# Patient Record
Sex: MALE | Race: WHITE | NOT HISPANIC OR LATINO
[De-identification: names, ages, dates, MRNs, and addresses within clinical notes are randomized per-mention and may not be internally consistent; named-entity substitution may affect disease eponyms.]

---

## 2017-06-09 ENCOUNTER — LABORATORY RESULT (OUTPATIENT)
Age: 62
End: 2017-06-09

## 2017-06-09 ENCOUNTER — APPOINTMENT (OUTPATIENT)
Dept: INTERNAL MEDICINE | Facility: CLINIC | Age: 62
End: 2017-06-09

## 2017-06-12 LAB
ALBUMIN SERPL ELPH-MCNC: 4.5 G/DL
ALP BLD-CCNC: 50 U/L
ALT SERPL-CCNC: 12 U/L
ANION GAP SERPL CALC-SCNC: 20 MMOL/L
AST SERPL-CCNC: 17 U/L
BASOPHILS # BLD AUTO: 0.06 K/UL
BASOPHILS NFR BLD AUTO: 0.8 %
BILIRUB SERPL-MCNC: 0.7 MG/DL
BUN SERPL-MCNC: 15 MG/DL
CALCIUM SERPL-MCNC: 9.6 MG/DL
CHLORIDE SERPL-SCNC: 103 MMOL/L
CHOLEST SERPL-MCNC: 168 MG/DL
CHOLEST/HDLC SERPL: 2.8 RATIO
CO2 SERPL-SCNC: 21 MMOL/L
CREAT SERPL-MCNC: 1.1 MG/DL
EOSINOPHIL # BLD AUTO: 0.55 K/UL
EOSINOPHIL NFR BLD AUTO: 6.9 %
GLUCOSE SERPL-MCNC: 102 MG/DL
HBA1C MFR BLD HPLC: 5 %
HCT VFR BLD CALC: 45.4 %
HDLC SERPL-MCNC: 59 MG/DL
HGB BLD-MCNC: 15.7 G/DL
IMM GRANULOCYTES NFR BLD AUTO: 0.1 %
LDLC SERPL CALC-MCNC: 101 MG/DL
LDLC SERPL DIRECT ASSAY-MCNC: 109 MG/DL
LYMPHOCYTES # BLD AUTO: 2.95 K/UL
LYMPHOCYTES NFR BLD AUTO: 37.2 %
MAN DIFF?: NORMAL
MCHC RBC-ENTMCNC: 29.3 PG
MCHC RBC-ENTMCNC: 34.6 GM/DL
MCV RBC AUTO: 84.7 FL
MONOCYTES # BLD AUTO: 0.73 K/UL
MONOCYTES NFR BLD AUTO: 9.2 %
NEUTROPHILS # BLD AUTO: 3.63 K/UL
NEUTROPHILS NFR BLD AUTO: 45.8 %
PLATELET # BLD AUTO: 257 K/UL
POTASSIUM SERPL-SCNC: 4.7 MMOL/L
PROT SERPL-MCNC: 7 G/DL
PSA SERPL-MCNC: 1.17 NG/ML
RBC # BLD: 5.36 M/UL
RBC # FLD: 13.9 %
SAVE SPECIMEN: NORMAL
SODIUM SERPL-SCNC: 144 MMOL/L
T3 SERPL-MCNC: 112 NG/DL
T3RU NFR SERPL: 0.88 INDEX
T4 FREE SERPL-MCNC: 1.7 NG/DL
TRIGL SERPL-MCNC: 38 MG/DL
TSH SERPL-ACNC: 1.74 UIU/ML
WBC # FLD AUTO: 7.93 K/UL

## 2017-06-24 ENCOUNTER — APPOINTMENT (OUTPATIENT)
Dept: INTERNAL MEDICINE | Facility: CLINIC | Age: 62
End: 2017-06-24

## 2017-06-24 VITALS
HEIGHT: 71 IN | SYSTOLIC BLOOD PRESSURE: 108 MMHG | BODY MASS INDEX: 24.22 KG/M2 | WEIGHT: 173 LBS | DIASTOLIC BLOOD PRESSURE: 74 MMHG

## 2017-09-19 ENCOUNTER — APPOINTMENT (OUTPATIENT)
Dept: OPHTHALMOLOGY | Facility: CLINIC | Age: 62
End: 2017-09-19
Payer: COMMERCIAL

## 2017-09-19 DIAGNOSIS — H43.811 VITREOUS DEGENERATION, RIGHT EYE: ICD-10-CM

## 2017-09-19 PROCEDURE — 92134 CPTRZ OPH DX IMG PST SGM RTA: CPT

## 2017-09-19 PROCEDURE — 92004 COMPRE OPH EXAM NEW PT 1/>: CPT

## 2018-01-28 ENCOUNTER — TRANSCRIPTION ENCOUNTER (OUTPATIENT)
Age: 63
End: 2018-01-28

## 2018-02-19 ENCOUNTER — APPOINTMENT (OUTPATIENT)
Dept: INTERNAL MEDICINE | Facility: CLINIC | Age: 63
End: 2018-02-19
Payer: COMMERCIAL

## 2018-02-19 VITALS
BODY MASS INDEX: 23.66 KG/M2 | SYSTOLIC BLOOD PRESSURE: 120 MMHG | DIASTOLIC BLOOD PRESSURE: 70 MMHG | HEIGHT: 71 IN | WEIGHT: 169 LBS

## 2018-02-19 PROCEDURE — 99213 OFFICE O/P EST LOW 20 MIN: CPT

## 2018-02-20 ENCOUNTER — APPOINTMENT (OUTPATIENT)
Dept: INTERNAL MEDICINE | Facility: CLINIC | Age: 63
End: 2018-02-20

## 2018-09-07 ENCOUNTER — LABORATORY RESULT (OUTPATIENT)
Age: 63
End: 2018-09-07

## 2018-09-07 ENCOUNTER — APPOINTMENT (OUTPATIENT)
Dept: INTERNAL MEDICINE | Facility: CLINIC | Age: 63
End: 2018-09-07
Payer: COMMERCIAL

## 2018-09-07 PROCEDURE — 36415 COLL VENOUS BLD VENIPUNCTURE: CPT

## 2018-09-10 LAB
ALBUMIN SERPL ELPH-MCNC: 4.2 G/DL
ALP BLD-CCNC: 53 U/L
ALT SERPL-CCNC: 10 U/L
ANION GAP SERPL CALC-SCNC: 13 MMOL/L
AST SERPL-CCNC: 21 U/L
BASOPHILS # BLD AUTO: 0.08 K/UL
BASOPHILS NFR BLD AUTO: 1.1 %
BILIRUB SERPL-MCNC: 0.6 MG/DL
BUN SERPL-MCNC: 13 MG/DL
CALCIUM SERPL-MCNC: 9.1 MG/DL
CHLORIDE SERPL-SCNC: 101 MMOL/L
CHOLEST SERPL-MCNC: 164 MG/DL
CHOLEST/HDLC SERPL: 3.3 RATIO
CO2 SERPL-SCNC: 25 MMOL/L
CREAT SERPL-MCNC: 0.94 MG/DL
EOSINOPHIL # BLD AUTO: 0.52 K/UL
EOSINOPHIL NFR BLD AUTO: 7.5 %
GLUCOSE SERPL-MCNC: 97 MG/DL
HBA1C MFR BLD HPLC: 5.1 %
HCT VFR BLD CALC: 45.7 %
HDLC SERPL-MCNC: 49 MG/DL
HGB BLD-MCNC: 15.2 G/DL
IMM GRANULOCYTES NFR BLD AUTO: 0.1 %
LDLC SERPL CALC-MCNC: 108 MG/DL
LDLC SERPL DIRECT ASSAY-MCNC: 110 MG/DL
LYMPHOCYTES # BLD AUTO: 2.33 K/UL
LYMPHOCYTES NFR BLD AUTO: 33.5 %
MAN DIFF?: NORMAL
MCHC RBC-ENTMCNC: 28.7 PG
MCHC RBC-ENTMCNC: 33.3 GM/DL
MCV RBC AUTO: 86.4 FL
MONOCYTES # BLD AUTO: 0.53 K/UL
MONOCYTES NFR BLD AUTO: 7.6 %
NEUTROPHILS # BLD AUTO: 3.49 K/UL
NEUTROPHILS NFR BLD AUTO: 50.2 %
PLATELET # BLD AUTO: 254 K/UL
POTASSIUM SERPL-SCNC: 4.7 MMOL/L
PROT SERPL-MCNC: 6.9 G/DL
PSA SERPL-MCNC: 1.13 NG/ML
RBC # BLD: 5.29 M/UL
RBC # FLD: 14.3 %
SAVE SPECIMEN: NORMAL
SODIUM SERPL-SCNC: 139 MMOL/L
T3 SERPL-MCNC: 120 NG/DL
T3RU NFR SERPL: 0.92 INDEX
T4 FREE SERPL-MCNC: 1.8 NG/DL
TRIGL SERPL-MCNC: 33 MG/DL
TSH SERPL-ACNC: 1.83 UIU/ML
WBC # FLD AUTO: 6.96 K/UL

## 2018-10-18 ENCOUNTER — EMERGENCY (EMERGENCY)
Facility: HOSPITAL | Age: 63
LOS: 1 days | Discharge: HOME | End: 2018-10-18
Attending: EMERGENCY MEDICINE
Payer: COMMERCIAL

## 2018-10-18 VITALS
TEMPERATURE: 98 F | HEIGHT: 71 IN | WEIGHT: 173.06 LBS | DIASTOLIC BLOOD PRESSURE: 84 MMHG | SYSTOLIC BLOOD PRESSURE: 156 MMHG | OXYGEN SATURATION: 97 % | HEART RATE: 67 BPM | RESPIRATION RATE: 18 BRPM

## 2018-10-18 VITALS
TEMPERATURE: 98 F | DIASTOLIC BLOOD PRESSURE: 95 MMHG | RESPIRATION RATE: 18 BRPM | OXYGEN SATURATION: 97 % | HEART RATE: 58 BPM | SYSTOLIC BLOOD PRESSURE: 137 MMHG

## 2018-10-18 LAB
ALBUMIN SERPL ELPH-MCNC: 4.4 G/DL — SIGNIFICANT CHANGE UP (ref 3.3–5)
ALP SERPL-CCNC: 55 U/L — SIGNIFICANT CHANGE UP (ref 40–120)
ALT FLD-CCNC: 13 U/L — SIGNIFICANT CHANGE UP (ref 10–45)
ANION GAP SERPL CALC-SCNC: 12 MMOL/L — SIGNIFICANT CHANGE UP (ref 5–17)
AST SERPL-CCNC: 12 U/L — SIGNIFICANT CHANGE UP (ref 10–40)
BASOPHILS # BLD AUTO: 0 K/UL — SIGNIFICANT CHANGE UP (ref 0–0.2)
BASOPHILS NFR BLD AUTO: 0.4 % — SIGNIFICANT CHANGE UP (ref 0–2)
BILIRUB SERPL-MCNC: 0.6 MG/DL — SIGNIFICANT CHANGE UP (ref 0.2–1.2)
BUN SERPL-MCNC: 10 MG/DL — SIGNIFICANT CHANGE UP (ref 7–23)
CALCIUM SERPL-MCNC: 8.7 MG/DL — SIGNIFICANT CHANGE UP (ref 8.4–10.5)
CHLORIDE SERPL-SCNC: 102 MMOL/L — SIGNIFICANT CHANGE UP (ref 96–108)
CO2 SERPL-SCNC: 26 MMOL/L — SIGNIFICANT CHANGE UP (ref 22–31)
CREAT SERPL-MCNC: 0.95 MG/DL — SIGNIFICANT CHANGE UP (ref 0.5–1.3)
EOSINOPHIL # BLD AUTO: 0.1 K/UL — SIGNIFICANT CHANGE UP (ref 0–0.5)
EOSINOPHIL NFR BLD AUTO: 0.9 % — SIGNIFICANT CHANGE UP (ref 0–6)
GLUCOSE SERPL-MCNC: 117 MG/DL — HIGH (ref 70–99)
HCT VFR BLD CALC: 49.2 % — SIGNIFICANT CHANGE UP (ref 39–50)
HGB BLD-MCNC: 17 G/DL — SIGNIFICANT CHANGE UP (ref 13–17)
LIDOCAIN IGE QN: 21 U/L — SIGNIFICANT CHANGE UP (ref 7–60)
LYMPHOCYTES # BLD AUTO: 1.8 K/UL — SIGNIFICANT CHANGE UP (ref 1–3.3)
LYMPHOCYTES # BLD AUTO: 22.9 % — SIGNIFICANT CHANGE UP (ref 13–44)
MCHC RBC-ENTMCNC: 29.5 PG — SIGNIFICANT CHANGE UP (ref 27–34)
MCHC RBC-ENTMCNC: 34.5 GM/DL — SIGNIFICANT CHANGE UP (ref 32–36)
MCV RBC AUTO: 85.3 FL — SIGNIFICANT CHANGE UP (ref 80–100)
MONOCYTES # BLD AUTO: 0.4 K/UL — SIGNIFICANT CHANGE UP (ref 0–0.9)
MONOCYTES NFR BLD AUTO: 5.6 % — SIGNIFICANT CHANGE UP (ref 2–14)
NEUTROPHILS # BLD AUTO: 5.6 K/UL — SIGNIFICANT CHANGE UP (ref 1.8–7.4)
NEUTROPHILS NFR BLD AUTO: 70.3 % — SIGNIFICANT CHANGE UP (ref 43–77)
PLATELET # BLD AUTO: 259 K/UL — SIGNIFICANT CHANGE UP (ref 150–400)
POTASSIUM SERPL-MCNC: 4.3 MMOL/L — SIGNIFICANT CHANGE UP (ref 3.5–5.3)
POTASSIUM SERPL-SCNC: 4.3 MMOL/L — SIGNIFICANT CHANGE UP (ref 3.5–5.3)
PROT SERPL-MCNC: 7.4 G/DL — SIGNIFICANT CHANGE UP (ref 6–8.3)
RBC # BLD: 5.76 M/UL — SIGNIFICANT CHANGE UP (ref 4.2–5.8)
RBC # FLD: 12.2 % — SIGNIFICANT CHANGE UP (ref 10.3–14.5)
SODIUM SERPL-SCNC: 140 MMOL/L — SIGNIFICANT CHANGE UP (ref 135–145)
TROPONIN T, HIGH SENSITIVITY RESULT: 7 NG/L — SIGNIFICANT CHANGE UP (ref 0–51)
TROPONIN T, HIGH SENSITIVITY RESULT: 8 NG/L — SIGNIFICANT CHANGE UP (ref 0–51)
WBC # BLD: 8 K/UL — SIGNIFICANT CHANGE UP (ref 3.8–10.5)
WBC # FLD AUTO: 8 K/UL — SIGNIFICANT CHANGE UP (ref 3.8–10.5)

## 2018-10-18 PROCEDURE — 83690 ASSAY OF LIPASE: CPT

## 2018-10-18 PROCEDURE — 99283 EMERGENCY DEPT VISIT LOW MDM: CPT | Mod: 25

## 2018-10-18 PROCEDURE — 93005 ELECTROCARDIOGRAM TRACING: CPT

## 2018-10-18 PROCEDURE — 99284 EMERGENCY DEPT VISIT MOD MDM: CPT | Mod: 25

## 2018-10-18 PROCEDURE — 85027 COMPLETE CBC AUTOMATED: CPT

## 2018-10-18 PROCEDURE — 96374 THER/PROPH/DIAG INJ IV PUSH: CPT

## 2018-10-18 PROCEDURE — 71046 X-RAY EXAM CHEST 2 VIEWS: CPT | Mod: 26

## 2018-10-18 PROCEDURE — 80053 COMPREHEN METABOLIC PANEL: CPT

## 2018-10-18 PROCEDURE — 71046 X-RAY EXAM CHEST 2 VIEWS: CPT

## 2018-10-18 PROCEDURE — 93010 ELECTROCARDIOGRAM REPORT: CPT

## 2018-10-18 PROCEDURE — 84484 ASSAY OF TROPONIN QUANT: CPT

## 2018-10-18 RX ORDER — FAMOTIDINE 10 MG/ML
20 INJECTION INTRAVENOUS ONCE
Qty: 0 | Refills: 0 | Status: COMPLETED | OUTPATIENT
Start: 2018-10-18 | End: 2018-10-18

## 2018-10-18 RX ADMIN — FAMOTIDINE 20 MILLIGRAM(S): 10 INJECTION INTRAVENOUS at 11:43

## 2018-10-18 NOTE — ED ADULT NURSE NOTE - OBJECTIVE STATEMENT
61 y/o male presents to ED from urgent care, AOx4. Pt reports upper abdominal pain and reports an abnormal EKG from urgent care per pt. Pt denies taking anything for pain before arrival and was given liquid antacid at urgent care. Lung sounds CTA. Hx of anxiety, takes Valium and Xanax occasionally. Pt reports decreased PO intake. Pt denies CP, SOB, dizziness, chills or fever. Pt denies any recent travel or sick contacts. 63 y/o male presents to ED from urgent care, AOx4. Pt reports upper abdominal pain 4/10, described as "discomfort" and reports an abnormal EKG from urgent care per pt. Pt denies taking anything for pain before arrival and was given liquid antacid at urgent care. Lung sounds CTA. Abdomen is soft, non-tender and non-distended on palpation. Pt denies any hematuria, painful or burning on urination. Hx of anxiety, takes Valium and Xanax occasionally. Pt reports decreased PO intake. Pt denies CP, SOB, dizziness, chills or fever. Pt denies any recent travel or sick contacts. Wife at bedside. 63 y/o male presents to ED from urgent care, AOx4. Pt reports upper abdominal pain 4/10, described as "discomfort" and heartburn feeling, constant from last night. Pt reports urgent care is concerned the pain is related to the gallbladder and also had an abnormal EKG.  Pt denies taking anything for pain before arrival and was given liquid antacid at urgent care. Lung sounds CTA. Abdomen is soft, non-tender and non-distended on palpation. Pt denies any hematuria, painful or burning on urination. Hx of anxiety, takes Valium and Xanax occasionally. Pt reports decreased PO intake. Pt denies CP, SOB, dizziness, chills or fever. Pt denies any recent travel or sick contacts. Wife at bedside.

## 2018-10-18 NOTE — ED PROVIDER NOTE - MEDICAL DECISION MAKING DETAILS
63yo M no reported PMSH with abdominal pain. DDx: cardiac vs pancreatic/GI. CBC/CMP, lipase, troponin, CXR. IV pepcid. 61yo M no reported PMSH with abdominal pain. DDx: cardiac vs pancreatic/GI. CBC/CMP, lipase, troponin, CXR. IV pepcid.    ZAIN Benitez MD: Pt is a 63 y/o male with no sig known PMH who is sent in from urgent care with complaints of abdominal pain. Pt was sent from urgent care due to finding Q waves in inferior leads on EKG. Pt explains that he has been having deep epigastric burning x1 day after a heavy meal that kept him from sleeping overnight. Pt did not have relief with Pepto-bismol abd activated charcoal at home, and Maalox from urgent care. Pt explains having increased stress at home and occasional dull chest discomfort. Pt endorses hx of drinking 3 drinks (beer+vodka) daily for 10 years. He now drinks 1xbeer/week. Pt's wife at bedside would also like gallbladder evaluation 2/2 urgent care verbal comments. He denies any chest pain, shortness of breath, nausea/vomiting, constipation, diarrhea, bright red or dark rectal bleeding, fevers, chills. Pt states that he is very active, exercises frequently, and has not sx of CP/SOB/epigastric pain with exertion/exercise. DDx: ACS, gerd, acid reflux, gastritis. Plan: basic labs, cardiac labs, cxr, GI cocktail, reassess

## 2018-10-18 NOTE — ED PROVIDER NOTE - OBJECTIVE STATEMENT
63yo M with no reported PMH presenting from urgent care with complaints of abdominal pain. Pt was sent from urgent care 2/2 concerns for Q waves in inferior leads on EKG. Pt explains that he has been having deep epigastric burning x1 day after a heavy meal that kept him from sleeping overnight.  Pt endorses hx of drinking 3 drinks (beer+vodka) daily for 10 years. He now drinks 1xbeer/week. 61yo M with no reported PMSH presenting from urgent care with complaints of abdominal pain. Pt was sent from urgent care 2/2 concerns for Q waves in inferior leads on EKG. Pt explains that he has been having deep epigastric burning x1 day after a heavy meal that kept him from sleeping overnight. Pt did not have relief with Pepto-bismol, activated charcoal, and Maalox. Pt explains having increased stressors and occasional dull chest discomfort. Pt endorses hx of drinking 3 drinks (beer+vodka) daily for 10 years. He now drinks 1xbeer/week. He denies any chest pain, shortness of breath, nausea/vomiting, constipation, diarrhea, bright red or dark rectal bleeding, fevers, chills. 61yo M with no reported PMSH presenting from urgent care with complaints of abdominal pain. Pt was sent from urgent care 2/2 concerns for Q waves in inferior leads on EKG. Pt explains that he has been having deep epigastric burning x1 day after a heavy meal that kept him from sleeping overnight. Pt did not have relief with Pepto-bismol abd activated charcoal at home, and Maalox from urgent care. Pt explains having increased stress at home and occasional dull chest discomfort. Pt endorses hx of drinking 3 drinks (beer+vodka) daily for 10 years. He now drinks 1xbeer/week. Pt's wife at bedside would also like gallbladder evaluation 2/2 urgent care verbal comments. He denies any chest pain, shortness of breath, nausea/vomiting, constipation, diarrhea, bright red or dark rectal bleeding, fevers, chills. PCP: Dr. Leland Schwartz

## 2018-10-18 NOTE — ED PROVIDER NOTE - NONTENDER LOCATION
left lower quadrant/umbilical/periumbilical/left upper quadrant/right upper quadrant/right lower quadrant

## 2018-10-18 NOTE — ED PROVIDER NOTE - PROGRESS NOTE DETAILS
Pt endorses improvement in epigastric burning s/p IV pepcid. Troponin on presentation 8. Repeat troponin pending.

## 2018-10-18 NOTE — ED ADULT NURSE NOTE - DISCHARGE TEACHING
follow up with PCP, GI  Return to ED for new or worsening symptoms. Take Pepcid or Maalox over the counter.

## 2018-10-18 NOTE — ED ADULT NURSE NOTE - NSIMPLEMENTINTERV_GEN_ALL_ED
Implemented All Universal Safety Interventions:  [place] to call system. Call bell, personal items and telephone within reach. Instruct patient to call for assistance. Room bathroom lighting operational. Non-slip footwear when patient is off stretcher. Physically safe environment: no spills, clutter or unnecessary equipment. Stretcher in lowest position, wheels locked, appropriate side rails in place.

## 2019-01-15 ENCOUNTER — APPOINTMENT (OUTPATIENT)
Dept: INTERNAL MEDICINE | Facility: CLINIC | Age: 64
End: 2019-01-15
Payer: COMMERCIAL

## 2019-01-15 ENCOUNTER — LABORATORY RESULT (OUTPATIENT)
Age: 64
End: 2019-01-15

## 2019-01-15 VITALS
WEIGHT: 169 LBS | DIASTOLIC BLOOD PRESSURE: 70 MMHG | SYSTOLIC BLOOD PRESSURE: 110 MMHG | HEIGHT: 71 IN | HEART RATE: 58 BPM | BODY MASS INDEX: 23.66 KG/M2

## 2019-01-15 DIAGNOSIS — K21.9 GASTRO-ESOPHAGEAL REFLUX DISEASE W/OUT ESOPHAGITIS: ICD-10-CM

## 2019-01-15 DIAGNOSIS — Z87.891 PERSONAL HISTORY OF NICOTINE DEPENDENCE: ICD-10-CM

## 2019-01-15 DIAGNOSIS — Z80.0 FAMILY HISTORY OF MALIGNANT NEOPLASM OF DIGESTIVE ORGANS: ICD-10-CM

## 2019-01-15 PROCEDURE — 93000 ELECTROCARDIOGRAM COMPLETE: CPT

## 2019-01-15 PROCEDURE — 99397 PER PM REEVAL EST PAT 65+ YR: CPT | Mod: 25

## 2019-01-15 PROCEDURE — 36415 COLL VENOUS BLD VENIPUNCTURE: CPT

## 2019-01-15 PROCEDURE — 94010 BREATHING CAPACITY TEST: CPT

## 2019-01-15 NOTE — ASSESSMENT
[FreeTextEntry1] : A1C\par FBW\par Patient refuses all vaccinations, including influenza, pneumonia, shingles, and Tdap

## 2019-01-15 NOTE — HISTORY OF PRESENT ILLNESS
[FreeTextEntry1] : CPE [de-identified] : Occasional mild esophageal reflux, better with OTC Pepcid. No abdominal pain.

## 2019-01-17 LAB
ALBUMIN SERPL ELPH-MCNC: 4.3 G/DL
ALP BLD-CCNC: 54 U/L
ALT SERPL-CCNC: 8 U/L
ANION GAP SERPL CALC-SCNC: 9 MMOL/L
AST SERPL-CCNC: 18 U/L
BASOPHILS # BLD AUTO: 0.04 K/UL
BASOPHILS NFR BLD AUTO: 0.6 %
BILIRUB SERPL-MCNC: 0.7 MG/DL
BUN SERPL-MCNC: 14 MG/DL
CALCIUM SERPL-MCNC: 9.4 MG/DL
CHLORIDE SERPL-SCNC: 102 MMOL/L
CHOLEST SERPL-MCNC: 170 MG/DL
CHOLEST/HDLC SERPL: 3 RATIO
CO2 SERPL-SCNC: 28 MMOL/L
CREAT SERPL-MCNC: 0.93 MG/DL
EOSINOPHIL # BLD AUTO: 0.36 K/UL
EOSINOPHIL NFR BLD AUTO: 5.4 %
GLUCOSE SERPL-MCNC: 114 MG/DL
HBA1C MFR BLD HPLC: 5 %
HCT VFR BLD CALC: 46.2 %
HDLC SERPL-MCNC: 56 MG/DL
HGB BLD-MCNC: 15.7 G/DL
IMM GRANULOCYTES NFR BLD AUTO: 0.2 %
LDLC SERPL CALC-MCNC: 107 MG/DL
LYMPHOCYTES # BLD AUTO: 1.91 K/UL
LYMPHOCYTES NFR BLD AUTO: 28.9 %
MAN DIFF?: NORMAL
MCHC RBC-ENTMCNC: 29.2 PG
MCHC RBC-ENTMCNC: 34 GM/DL
MCV RBC AUTO: 85.9 FL
MONOCYTES # BLD AUTO: 0.54 K/UL
MONOCYTES NFR BLD AUTO: 8.2 %
NEUTROPHILS # BLD AUTO: 3.75 K/UL
NEUTROPHILS NFR BLD AUTO: 56.7 %
PLATELET # BLD AUTO: 273 K/UL
POTASSIUM SERPL-SCNC: 4.9 MMOL/L
PROT SERPL-MCNC: 6.9 G/DL
PSA SERPL-MCNC: 1.07 NG/ML
RBC # BLD: 5.38 M/UL
RBC # FLD: 14.4 %
SODIUM SERPL-SCNC: 139 MMOL/L
TRIGL SERPL-MCNC: 35 MG/DL
TSH SERPL-ACNC: 1.5 UIU/ML
WBC # FLD AUTO: 6.61 K/UL

## 2020-02-07 NOTE — ED PROVIDER NOTE - NS_EDPROVIDERDISPOUSERTYPE_ED_A_ED
Vaccine Information Statement(s) was given today. This has been reviewed, questions answered, and verbal consent given by Parent for injection(s) and administration of Human papillomavirus (HPV) (patient did not wait the recommended 15 mins after receiving the HPV vaccine) and Influenza (Inactivated). 1. Does the patient have a moderate to severe fever? No  2. Has the patient had a serious reaction to a flu shot before? No  3. Has the patient ever had Guillian [place] Syndrome within 6 weeks of a previous flu shot? No  4. Is the patient less than 10months of age? No    Patient is eligible to receive the vaccine based on all questions being answered as 'No'. Patient tolerated without incident. See immunization grid for documentation.
Attending Attestation (For Attendings USE Only)...

## 2020-09-02 ENCOUNTER — LABORATORY RESULT (OUTPATIENT)
Age: 65
End: 2020-09-02

## 2020-09-02 ENCOUNTER — APPOINTMENT (OUTPATIENT)
Dept: INTERNAL MEDICINE | Facility: CLINIC | Age: 65
End: 2020-09-02

## 2020-09-03 LAB
ALBUMIN SERPL ELPH-MCNC: 4.8 G/DL
ALP BLD-CCNC: 57 U/L
ALT SERPL-CCNC: 21 U/L
ANION GAP SERPL CALC-SCNC: 13 MMOL/L
AST SERPL-CCNC: 22 U/L
BASOPHILS # BLD AUTO: 0.06 K/UL
BASOPHILS NFR BLD AUTO: 0.7 %
BILIRUB SERPL-MCNC: 0.8 MG/DL
BUN SERPL-MCNC: 16 MG/DL
CALCIUM SERPL-MCNC: 9.1 MG/DL
CHLORIDE SERPL-SCNC: 103 MMOL/L
CHOLEST SERPL-MCNC: 186 MG/DL
CHOLEST/HDLC SERPL: 3.4 RATIO
CO2 SERPL-SCNC: 23 MMOL/L
CREAT SERPL-MCNC: 0.81 MG/DL
EOSINOPHIL # BLD AUTO: 0.42 K/UL
EOSINOPHIL NFR BLD AUTO: 5 %
ESTIMATED AVERAGE GLUCOSE: 100 MG/DL
GLUCOSE SERPL-MCNC: 112 MG/DL
HBA1C MFR BLD HPLC: 5.1 %
HCT VFR BLD CALC: 47.9 %
HDLC SERPL-MCNC: 55 MG/DL
HGB BLD-MCNC: 16.8 G/DL
IMM GRANULOCYTES NFR BLD AUTO: 0.2 %
LDLC SERPL CALC-MCNC: 122 MG/DL
LDLC SERPL DIRECT ASSAY-MCNC: 123 MG/DL
LYMPHOCYTES # BLD AUTO: 2.72 K/UL
LYMPHOCYTES NFR BLD AUTO: 32.2 %
MAN DIFF?: NORMAL
MCHC RBC-ENTMCNC: 30 PG
MCHC RBC-ENTMCNC: 35.1 GM/DL
MCV RBC AUTO: 85.5 FL
MONOCYTES # BLD AUTO: 0.62 K/UL
MONOCYTES NFR BLD AUTO: 7.3 %
NEUTROPHILS # BLD AUTO: 4.61 K/UL
NEUTROPHILS NFR BLD AUTO: 54.6 %
PLATELET # BLD AUTO: 264 K/UL
POTASSIUM SERPL-SCNC: 4.7 MMOL/L
PROT SERPL-MCNC: 7 G/DL
PSA SERPL-MCNC: 1.31 NG/ML
RBC # BLD: 5.6 M/UL
RBC # FLD: 13.4 %
SARS-COV-2 IGG SERPL IA-ACNC: 3.97 AU/ML
SARS-COV-2 IGG SERPL QL IA: NEGATIVE
SAVE SPECIMEN: NORMAL
SODIUM SERPL-SCNC: 139 MMOL/L
T3 SERPL-MCNC: 111 NG/DL
T3RU NFR SERPL: 0.9 TBI
T4 FREE SERPL-MCNC: 1.6 NG/DL
TRIGL SERPL-MCNC: 46 MG/DL
TSH SERPL-ACNC: 1.45 UIU/ML
WBC # FLD AUTO: 8.45 K/UL

## 2020-09-08 ENCOUNTER — APPOINTMENT (OUTPATIENT)
Dept: INTERNAL MEDICINE | Facility: CLINIC | Age: 65
End: 2020-09-08
Payer: COMMERCIAL

## 2020-09-08 VITALS
SYSTOLIC BLOOD PRESSURE: 140 MMHG | BODY MASS INDEX: 33.04 KG/M2 | WEIGHT: 175 LBS | DIASTOLIC BLOOD PRESSURE: 90 MMHG | HEIGHT: 61 IN

## 2020-09-08 VITALS — TEMPERATURE: 97.5 F

## 2020-09-08 DIAGNOSIS — L40.9 PSORIASIS, UNSPECIFIED: ICD-10-CM

## 2020-09-08 PROCEDURE — 99397 PER PM REEVAL EST PAT 65+ YR: CPT | Mod: 25

## 2020-09-08 PROCEDURE — 93000 ELECTROCARDIOGRAM COMPLETE: CPT

## 2020-09-08 NOTE — PHYSICAL EXAM
[No Acute Distress] : no acute distress [Well Developed] : well developed [Well Nourished] : well nourished [Well-Appearing] : well-appearing [Normal Outer Ear/Nose] : the outer ears and nose were normal in appearance [Supple] : supple [No Respiratory Distress] : no respiratory distress  [No Accessory Muscle Use] : no accessory muscle use [Clear to Auscultation] : lungs were clear to auscultation bilaterally [Normal Rate] : normal rate  [Regular Rhythm] : with a regular rhythm [No Edema] : there was no peripheral edema [Non Tender] : non-tender [Soft] : abdomen soft [Testes Mass (___cm)] : there were no testicular masses [Normal Posterior Cervical Nodes] : no posterior cervical lymphadenopathy [Normal Anterior Cervical Nodes] : no anterior cervical lymphadenopathy [No CVA Tenderness] : no CVA  tenderness [No Spinal Tenderness] : no spinal tenderness [Coordination Grossly Intact] : coordination grossly intact [Normal Gait] : normal gait [Normal Affect] : the affect was normal [Normal Mood] : the mood was normal

## 2021-06-29 ENCOUNTER — APPOINTMENT (OUTPATIENT)
Dept: OTOLARYNGOLOGY | Facility: CLINIC | Age: 66
End: 2021-06-29
Payer: COMMERCIAL

## 2021-06-29 VITALS
BODY MASS INDEX: 24.5 KG/M2 | HEART RATE: 74 BPM | DIASTOLIC BLOOD PRESSURE: 71 MMHG | SYSTOLIC BLOOD PRESSURE: 133 MMHG | WEIGHT: 175 LBS | TEMPERATURE: 97.3 F | HEIGHT: 71 IN

## 2021-06-29 PROCEDURE — 99204 OFFICE O/P NEW MOD 45 MIN: CPT | Mod: 25

## 2021-06-29 PROCEDURE — G0268 REMOVAL OF IMPACTED WAX MD: CPT

## 2021-06-29 PROCEDURE — 92567 TYMPANOMETRY: CPT

## 2021-06-29 PROCEDURE — 92557 COMPREHENSIVE HEARING TEST: CPT

## 2021-06-29 PROCEDURE — 99072 ADDL SUPL MATRL&STAF TM PHE: CPT

## 2021-06-29 RX ORDER — OFLOXACIN OTIC 3 MG/ML
0.3 SOLUTION AURICULAR (OTIC)
Qty: 1 | Refills: 0 | Status: ACTIVE | COMMUNITY
Start: 2021-06-29 | End: 1900-01-01

## 2021-06-29 NOTE — ASSESSMENT
[FreeTextEntry1] : Patient with severe exostosis bilaterally left worse than right left ear a lot of debris which was suctioned out and impacted wax in the small canals that was unable to be totally removed we put him on some Debrox he will follow-up and see us in a couple of weeks and hopefully will be able to remove it we discussed the possibility of surgery for his exostosis because of his severe.  Is interested we will refer him to one of our neurootologist.  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

## 2021-06-29 NOTE — END OF VISIT
[FreeTextEntry3] : I saw and examined this patient in person. I have discussed with Charissa Babcock, Physician Assistant, in detail the above note and agree with the above assessment and plan of care.\par

## 2021-06-29 NOTE — PHYSICAL EXAM
[Midline] : trachea located in midline position [Normal] : no rashes [de-identified] : exostoses bilaterally

## 2021-06-29 NOTE — CONSULT LETTER
[Dear  ___] : Dear  [unfilled], [Consult Letter:] : I had the pleasure of evaluating your patient, [unfilled]. [Please see my note below.] : Please see my note below. [Consult Closing:] : Thank you very much for allowing me to participate in the care of this patient.  If you have any questions, please do not hesitate to contact me. [Sincerely,] : Sincerely, [FreeTextEntry3] : Valentin Forrest MD\par [facility]\par Otolaryngology and Facial Plastics\par Associated Professor, [employer]\par

## 2021-06-29 NOTE — HISTORY OF PRESENT ILLNESS
[de-identified] : Patient is here today with hearing loss in the left ear that started a few weeks ago. He feels like the left ear is muffled moderately. He swims a lot and was concerned that he was blocked with wax. He does not have any issues with dizziness but admits to high frequency noises that come and go in the left ear since this started. He Also states that he has had a dull throbbing pain in the left ear that comes and goes. He tried to clean his ears with peroxide but there was no change in his symptoms. He describes the muffling as moderate in the left ear. He does not have any nasal congestion complaints.

## 2021-07-12 ENCOUNTER — APPOINTMENT (OUTPATIENT)
Dept: OTOLARYNGOLOGY | Facility: CLINIC | Age: 66
End: 2021-07-12

## 2021-08-30 ENCOUNTER — APPOINTMENT (OUTPATIENT)
Dept: INTERNAL MEDICINE | Facility: CLINIC | Age: 66
End: 2021-08-30
Payer: COMMERCIAL

## 2021-08-30 ENCOUNTER — LABORATORY RESULT (OUTPATIENT)
Age: 66
End: 2021-08-30

## 2021-08-30 PROCEDURE — 99072 ADDL SUPL MATRL&STAF TM PHE: CPT

## 2021-08-30 PROCEDURE — 36415 COLL VENOUS BLD VENIPUNCTURE: CPT

## 2021-08-31 LAB
ALBUMIN SERPL ELPH-MCNC: 4.4 G/DL
ALP BLD-CCNC: 60 U/L
ALT SERPL-CCNC: 17 U/L
ANION GAP SERPL CALC-SCNC: 10 MMOL/L
AST SERPL-CCNC: 25 U/L
BASOPHILS # BLD AUTO: 0.06 K/UL
BASOPHILS NFR BLD AUTO: 0.7 %
BILIRUB SERPL-MCNC: 0.6 MG/DL
BUN SERPL-MCNC: 16 MG/DL
CALCIUM SERPL-MCNC: 9.2 MG/DL
CHLORIDE SERPL-SCNC: 103 MMOL/L
CHOLEST SERPL-MCNC: 169 MG/DL
CO2 SERPL-SCNC: 26 MMOL/L
CREAT SERPL-MCNC: 0.97 MG/DL
EOSINOPHIL # BLD AUTO: 0.67 K/UL
EOSINOPHIL NFR BLD AUTO: 7.8 %
ESTIMATED AVERAGE GLUCOSE: 103 MG/DL
GLUCOSE SERPL-MCNC: 122 MG/DL
HBA1C MFR BLD HPLC: 5.2 %
HCT VFR BLD CALC: 46.4 %
HDLC SERPL-MCNC: 52 MG/DL
HGB BLD-MCNC: 16.2 G/DL
IMM GRANULOCYTES NFR BLD AUTO: 0.4 %
LDLC SERPL CALC-MCNC: 111 MG/DL
LDLC SERPL DIRECT ASSAY-MCNC: 107 MG/DL
LYMPHOCYTES # BLD AUTO: 2.86 K/UL
LYMPHOCYTES NFR BLD AUTO: 33.5 %
MAN DIFF?: NORMAL
MCHC RBC-ENTMCNC: 29.3 PG
MCHC RBC-ENTMCNC: 34.9 GM/DL
MCV RBC AUTO: 84.1 FL
MONOCYTES # BLD AUTO: 0.58 K/UL
MONOCYTES NFR BLD AUTO: 6.8 %
NEUTROPHILS # BLD AUTO: 4.34 K/UL
NEUTROPHILS NFR BLD AUTO: 50.8 %
NONHDLC SERPL-MCNC: 117 MG/DL
PLATELET # BLD AUTO: 258 K/UL
POTASSIUM SERPL-SCNC: 5 MMOL/L
PROT SERPL-MCNC: 6.8 G/DL
PSA SERPL-MCNC: 1.44 NG/ML
RBC # BLD: 5.52 M/UL
RBC # FLD: 13.4 %
SODIUM SERPL-SCNC: 138 MMOL/L
T3 SERPL-MCNC: 112 NG/DL
T3RU NFR SERPL: 1 TBI
T4 FREE SERPL-MCNC: 1.7 NG/DL
TRIGL SERPL-MCNC: 30 MG/DL
TSH SERPL-ACNC: 1.79 UIU/ML
WBC # FLD AUTO: 8.54 K/UL

## 2021-09-09 ENCOUNTER — APPOINTMENT (OUTPATIENT)
Dept: INTERNAL MEDICINE | Facility: CLINIC | Age: 66
End: 2021-09-09
Payer: COMMERCIAL

## 2021-09-09 VITALS
WEIGHT: 170 LBS | HEIGHT: 71 IN | DIASTOLIC BLOOD PRESSURE: 70 MMHG | SYSTOLIC BLOOD PRESSURE: 130 MMHG | BODY MASS INDEX: 23.8 KG/M2

## 2021-09-09 DIAGNOSIS — U07.1 COVID-19: ICD-10-CM

## 2021-09-09 PROCEDURE — 99072 ADDL SUPL MATRL&STAF TM PHE: CPT

## 2021-09-09 PROCEDURE — 93000 ELECTROCARDIOGRAM COMPLETE: CPT

## 2021-09-09 PROCEDURE — 99397 PER PM REEVAL EST PAT 65+ YR: CPT | Mod: 25

## 2021-09-09 NOTE — HISTORY OF PRESENT ILLNESS
PA initiated for Penoxyl wash and Clindamycin swabs, expected response in 5-7 days.    [FreeTextEntry1] : CPE [de-identified] : see ROS\par

## 2021-11-15 ENCOUNTER — APPOINTMENT (OUTPATIENT)
Dept: DISASTER EMERGENCY | Facility: CLINIC | Age: 66
End: 2021-11-15

## 2021-11-16 LAB — SARS-COV-2 N GENE NPH QL NAA+PROBE: NOT DETECTED

## 2022-02-04 ENCOUNTER — APPOINTMENT (OUTPATIENT)
Dept: DISASTER EMERGENCY | Facility: CLINIC | Age: 67
End: 2022-02-04

## 2022-02-07 ENCOUNTER — APPOINTMENT (OUTPATIENT)
Dept: GASTROENTEROLOGY | Facility: CLINIC | Age: 67
End: 2022-02-07
Payer: COMMERCIAL

## 2022-02-07 ENCOUNTER — LABORATORY RESULT (OUTPATIENT)
Age: 67
End: 2022-02-07

## 2022-02-07 PROCEDURE — 99072 ADDL SUPL MATRL&STAF TM PHE: CPT

## 2022-02-07 PROCEDURE — 45385 COLONOSCOPY W/LESION REMOVAL: CPT | Mod: 33

## 2022-02-07 PROCEDURE — 45380 COLONOSCOPY AND BIOPSY: CPT | Mod: 59

## 2022-10-31 ENCOUNTER — APPOINTMENT (OUTPATIENT)
Dept: INTERNAL MEDICINE | Facility: CLINIC | Age: 67
End: 2022-10-31

## 2022-11-02 ENCOUNTER — LABORATORY RESULT (OUTPATIENT)
Age: 67
End: 2022-11-02

## 2022-11-03 LAB
ALBUMIN SERPL ELPH-MCNC: 4.4 G/DL
ALP BLD-CCNC: 62 U/L
ALT SERPL-CCNC: 18 U/L
ANION GAP SERPL CALC-SCNC: 9 MMOL/L
AST SERPL-CCNC: 14 U/L
BASOPHILS # BLD AUTO: 0.05 K/UL
BASOPHILS NFR BLD AUTO: 0.5 %
BILIRUB SERPL-MCNC: 0.6 MG/DL
BUN SERPL-MCNC: 14 MG/DL
CALCIUM SERPL-MCNC: 9.5 MG/DL
CHLORIDE SERPL-SCNC: 101 MMOL/L
CHOLEST SERPL-MCNC: 180 MG/DL
CO2 SERPL-SCNC: 30 MMOL/L
CREAT SERPL-MCNC: 0.95 MG/DL
EGFR: 87 ML/MIN/1.73M2
EOSINOPHIL # BLD AUTO: 0.54 K/UL
EOSINOPHIL NFR BLD AUTO: 5.7 %
ESTIMATED AVERAGE GLUCOSE: 103 MG/DL
GLUCOSE SERPL-MCNC: 110 MG/DL
HBA1C MFR BLD HPLC: 5.2 %
HCT VFR BLD CALC: 48.1 %
HDLC SERPL-MCNC: 54 MG/DL
HGB BLD-MCNC: 16.4 G/DL
IMM GRANULOCYTES NFR BLD AUTO: 0.2 %
LDLC SERPL CALC-MCNC: 117 MG/DL
LYMPHOCYTES # BLD AUTO: 2.78 K/UL
LYMPHOCYTES NFR BLD AUTO: 29.5 %
MAN DIFF?: NORMAL
MCHC RBC-ENTMCNC: 29.4 PG
MCHC RBC-ENTMCNC: 34.1 GM/DL
MCV RBC AUTO: 86.2 FL
MONOCYTES # BLD AUTO: 0.6 K/UL
MONOCYTES NFR BLD AUTO: 6.4 %
NEUTROPHILS # BLD AUTO: 5.43 K/UL
NEUTROPHILS NFR BLD AUTO: 57.7 %
NONHDLC SERPL-MCNC: 126 MG/DL
PLATELET # BLD AUTO: 267 K/UL
POTASSIUM SERPL-SCNC: 5 MMOL/L
PROT SERPL-MCNC: 6.8 G/DL
PSA SERPL-MCNC: 1.3 NG/ML
RBC # BLD: 5.58 M/UL
RBC # FLD: 13.4 %
SODIUM SERPL-SCNC: 140 MMOL/L
T3 SERPL-MCNC: 97 NG/DL
T3RU NFR SERPL: 0.9 TBI
T4 FREE SERPL-MCNC: 1.6 NG/DL
TRIGL SERPL-MCNC: 45 MG/DL
TSH SERPL-ACNC: 1.45 UIU/ML
WBC # FLD AUTO: 9.42 K/UL

## 2022-11-04 ENCOUNTER — APPOINTMENT (OUTPATIENT)
Dept: INTERNAL MEDICINE | Facility: CLINIC | Age: 67
End: 2022-11-04

## 2022-11-04 VITALS
SYSTOLIC BLOOD PRESSURE: 120 MMHG | WEIGHT: 171 LBS | DIASTOLIC BLOOD PRESSURE: 70 MMHG | HEIGHT: 71 IN | BODY MASS INDEX: 23.94 KG/M2

## 2022-11-04 DIAGNOSIS — Z78.9 OTHER SPECIFIED HEALTH STATUS: ICD-10-CM

## 2022-11-04 DIAGNOSIS — Z71.89 OTHER SPECIFIED COUNSELING: ICD-10-CM

## 2022-11-04 PROCEDURE — 93000 ELECTROCARDIOGRAM COMPLETE: CPT

## 2022-11-04 PROCEDURE — 99397 PER PM REEVAL EST PAT 65+ YR: CPT | Mod: 25

## 2022-11-04 NOTE — HEALTH RISK ASSESSMENT
[Good] : ~his/her~  mood as  good [Former] : Former [Yes] : Yes [2 - 3 times a week (3 pts)] : 2 - 3  times a week (3 points) [1 or 2 (0 pts)] : 1 or 2 (0 points) [Never (0 pts)] : Never (0 points) [No falls in past year] : Patient reported no falls in the past year [0] : 2) Feeling down, depressed, or hopeless: Not at all (0) [Patient reported colonoscopy was normal] : Patient reported colonoscopy was normal [HIV test declined] : HIV test declined [Change in mental status noted] : No change in mental status noted [Language] : denies difficulty with language [Behavior] : denies difficulty with behavior [Learning/Retaining New Information] : denies difficulty learning/retaining new information [Handling Complex Tasks] : denies difficulty handling complex tasks [Reasoning] : denies difficulty with reasoning [Spatial Ability and Orientation] : denies difficulty with spatial ability and orientation [None] : None [With Family] : lives with family [] :  [Fully functional (bathing, dressing, toileting, transferring, walking, feeding)] : Fully functional (bathing, dressing, toileting, transferring, walking, feeding) [Fully functional (using the telephone, shopping, preparing meals, housekeeping, doing laundry, using] : Fully functional and needs no help or supervision to perform IADLs (using the telephone, shopping, preparing meals, housekeeping, doing laundry, using transportation, managing medications and managing finances) [Designated Healthcare Proxy] : Designated healthcare proxy [Name: ___] : Health Care Proxy's Name: [unfilled]  [Relationship: ___] : Relationship: [unfilled]

## 2022-11-04 NOTE — ASSESSMENT
[FreeTextEntry1] : Patient refuses all vaccines, including Prevnar 20, influenza, Shingrix, Tdap, and additional COVID-19 boosters.\par Follow hemoglobin A1c\par Follow lipid.  want LDL<130

## 2023-11-03 ENCOUNTER — APPOINTMENT (OUTPATIENT)
Dept: INTERNAL MEDICINE | Facility: CLINIC | Age: 68
End: 2023-11-03

## 2023-11-10 ENCOUNTER — APPOINTMENT (OUTPATIENT)
Dept: INTERNAL MEDICINE | Facility: CLINIC | Age: 68
End: 2023-11-10

## 2024-03-21 ENCOUNTER — NON-APPOINTMENT (OUTPATIENT)
Age: 69
End: 2024-03-21

## 2024-03-21 RX ORDER — SILDENAFIL 20 MG/1
20 TABLET ORAL
Qty: 30 | Refills: 1 | Status: ACTIVE | COMMUNITY
Start: 2019-04-09 | End: 1900-01-01

## 2024-06-19 LAB
ALBUMIN SERPL ELPH-MCNC: 4.6 G/DL
ALP BLD-CCNC: 62 U/L
ALT SERPL-CCNC: 20 U/L
ANION GAP SERPL CALC-SCNC: 14 MMOL/L
AST SERPL-CCNC: 17 U/L
BILIRUB SERPL-MCNC: 0.6 MG/DL
BUN SERPL-MCNC: 17 MG/DL
CALCIUM SERPL-MCNC: 9.2 MG/DL
CHLORIDE SERPL-SCNC: 103 MMOL/L
CHOLEST SERPL-MCNC: 192 MG/DL
CO2 SERPL-SCNC: 23 MMOL/L
CREAT SERPL-MCNC: 0.89 MG/DL
EGFR: 92 ML/MIN/1.73M2
ESTIMATED AVERAGE GLUCOSE: 105 MG/DL
GLUCOSE SERPL-MCNC: 132 MG/DL
HBA1C MFR BLD HPLC: 5.3 %
HCT VFR BLD CALC: 46.4 %
HDLC SERPL-MCNC: 52 MG/DL
HGB BLD-MCNC: 16.2 G/DL
LDLC SERPL CALC-MCNC: 130 MG/DL
MCHC RBC-ENTMCNC: 29.1 PG
MCHC RBC-ENTMCNC: 34.9 GM/DL
MCV RBC AUTO: 83.3 FL
NONHDLC SERPL-MCNC: 140 MG/DL
PLATELET # BLD AUTO: 279 K/UL
POTASSIUM SERPL-SCNC: 4.7 MMOL/L
PROT SERPL-MCNC: 7 G/DL
PSA SERPL-MCNC: 1.82 NG/ML
RBC # BLD: 5.57 M/UL
RBC # FLD: 14 %
SODIUM SERPL-SCNC: 139 MMOL/L
TRIGL SERPL-MCNC: 56 MG/DL
TSH SERPL-ACNC: 2 UIU/ML
WBC # FLD AUTO: 8.35 K/UL

## 2024-06-21 ENCOUNTER — APPOINTMENT (OUTPATIENT)
Dept: INTERNAL MEDICINE | Facility: CLINIC | Age: 69
End: 2024-06-21
Payer: COMMERCIAL

## 2024-06-21 VITALS
HEART RATE: 90 BPM | HEIGHT: 71 IN | BODY MASS INDEX: 23.8 KG/M2 | SYSTOLIC BLOOD PRESSURE: 150 MMHG | DIASTOLIC BLOOD PRESSURE: 86 MMHG | WEIGHT: 170 LBS | OXYGEN SATURATION: 96 %

## 2024-06-21 DIAGNOSIS — N52.9 MALE ERECTILE DYSFUNCTION, UNSPECIFIED: ICD-10-CM

## 2024-06-21 DIAGNOSIS — R73.01 IMPAIRED FASTING GLUCOSE: ICD-10-CM

## 2024-06-21 DIAGNOSIS — E78.00 PURE HYPERCHOLESTEROLEMIA, UNSPECIFIED: ICD-10-CM

## 2024-06-21 DIAGNOSIS — N40.0 BENIGN PROSTATIC HYPERPLASIA WITHOUT LOWER URINARY TRACT SYMPMS: ICD-10-CM

## 2024-06-21 DIAGNOSIS — R35.0 FREQUENCY OF MICTURITION: ICD-10-CM

## 2024-06-21 DIAGNOSIS — R97.20 ELEVATED PROSTATE, SPECIFIC ANTIGEN [PSA]: ICD-10-CM

## 2024-06-21 DIAGNOSIS — Z00.00 ENCOUNTER FOR GENERAL ADULT MEDICAL EXAMINATION W/OUT ABNORMAL FINDINGS: ICD-10-CM

## 2024-06-21 PROCEDURE — 99397 PER PM REEVAL EST PAT 65+ YR: CPT

## 2024-06-21 PROCEDURE — 99213 OFFICE O/P EST LOW 20 MIN: CPT | Mod: 25

## 2024-06-21 PROCEDURE — 93000 ELECTROCARDIOGRAM COMPLETE: CPT

## 2024-06-21 RX ORDER — SILDENAFIL 100 MG/1
100 TABLET, FILM COATED ORAL
Qty: 10 | Refills: 3 | Status: ACTIVE | COMMUNITY
Start: 2024-06-21 | End: 1900-01-01

## 2024-06-22 LAB
APPEARANCE: CLEAR
BACTERIA: NEGATIVE /HPF
BILIRUBIN URINE: NEGATIVE
BLOOD URINE: NEGATIVE
CAST: 0 /LPF
COLOR: NORMAL
CREAT SPEC-SCNC: 227 MG/DL
EPITHELIAL CELLS: 0 /HPF
GLUCOSE QUALITATIVE U: NEGATIVE MG/DL
KETONES URINE: ABNORMAL MG/DL
LEUKOCYTE ESTERASE URINE: NEGATIVE
MICROALBUMIN 24H UR DL<=1MG/L-MCNC: 4.1 MG/DL
MICROALBUMIN/CREAT 24H UR-RTO: 18 MG/G
MICROSCOPIC-UA: NORMAL
NITRITE URINE: NEGATIVE
PH URINE: 5.5
PROTEIN URINE: 30 MG/DL
RED BLOOD CELLS URINE: 1 /HPF
SPECIFIC GRAVITY URINE: >1.03
UROBILINOGEN URINE: 1 MG/DL
WHITE BLOOD CELLS URINE: 0 /HPF

## 2024-12-06 ENCOUNTER — LABORATORY RESULT (OUTPATIENT)
Age: 69
End: 2024-12-06

## 2024-12-10 ENCOUNTER — APPOINTMENT (OUTPATIENT)
Dept: INTERNAL MEDICINE | Facility: CLINIC | Age: 69
End: 2024-12-10
Payer: COMMERCIAL

## 2024-12-10 VITALS
DIASTOLIC BLOOD PRESSURE: 90 MMHG | HEIGHT: 71 IN | OXYGEN SATURATION: 98 % | HEART RATE: 80 BPM | WEIGHT: 175 LBS | SYSTOLIC BLOOD PRESSURE: 140 MMHG | BODY MASS INDEX: 24.5 KG/M2

## 2024-12-10 DIAGNOSIS — E78.00 PURE HYPERCHOLESTEROLEMIA, UNSPECIFIED: ICD-10-CM

## 2024-12-10 DIAGNOSIS — Z87.898 PERSONAL HISTORY OF OTHER SPECIFIED CONDITIONS: ICD-10-CM

## 2024-12-10 DIAGNOSIS — R73.01 IMPAIRED FASTING GLUCOSE: ICD-10-CM

## 2024-12-10 DIAGNOSIS — F41.9 ANXIETY DISORDER, UNSPECIFIED: ICD-10-CM

## 2024-12-10 DIAGNOSIS — R97.20 ELEVATED PROSTATE, SPECIFIC ANTIGEN [PSA]: ICD-10-CM

## 2024-12-10 PROCEDURE — G2211 COMPLEX E/M VISIT ADD ON: CPT

## 2024-12-10 PROCEDURE — 99214 OFFICE O/P EST MOD 30 MIN: CPT

## 2024-12-19 ENCOUNTER — APPOINTMENT (OUTPATIENT)
Dept: CT IMAGING | Facility: CLINIC | Age: 69
End: 2024-12-19
Payer: SELF-PAY

## 2024-12-19 ENCOUNTER — OUTPATIENT (OUTPATIENT)
Dept: OUTPATIENT SERVICES | Facility: HOSPITAL | Age: 69
LOS: 1 days | End: 2024-12-19
Payer: SELF-PAY

## 2024-12-19 ENCOUNTER — RESULT REVIEW (OUTPATIENT)
Age: 69
End: 2024-12-19

## 2024-12-19 DIAGNOSIS — Z00.8 ENCOUNTER FOR OTHER GENERAL EXAMINATION: ICD-10-CM

## 2024-12-19 PROCEDURE — 75571 CT HRT W/O DYE W/CA TEST: CPT | Mod: 26

## 2024-12-19 PROCEDURE — 75571 CT HRT W/O DYE W/CA TEST: CPT

## 2024-12-24 ENCOUNTER — APPOINTMENT (OUTPATIENT)
Dept: INTERNAL MEDICINE | Facility: CLINIC | Age: 69
End: 2024-12-24
Payer: COMMERCIAL

## 2024-12-24 DIAGNOSIS — R73.01 IMPAIRED FASTING GLUCOSE: ICD-10-CM

## 2024-12-24 DIAGNOSIS — E78.00 PURE HYPERCHOLESTEROLEMIA, UNSPECIFIED: ICD-10-CM

## 2024-12-24 DIAGNOSIS — I25.10 ATHEROSCLEROTIC HEART DISEASE OF NATIVE CORONARY ARTERY W/OUT ANGINA PECTORIS: ICD-10-CM

## 2024-12-24 PROCEDURE — 99213 OFFICE O/P EST LOW 20 MIN: CPT

## 2025-06-26 ENCOUNTER — APPOINTMENT (OUTPATIENT)
Dept: INTERNAL MEDICINE | Facility: CLINIC | Age: 70
End: 2025-06-26

## 2025-09-09 ENCOUNTER — APPOINTMENT (OUTPATIENT)
Dept: INTERNAL MEDICINE | Facility: CLINIC | Age: 70
End: 2025-09-09